# Patient Record
Sex: FEMALE | Race: WHITE | NOT HISPANIC OR LATINO
[De-identification: names, ages, dates, MRNs, and addresses within clinical notes are randomized per-mention and may not be internally consistent; named-entity substitution may affect disease eponyms.]

---

## 2021-12-10 PROBLEM — Z00.00 ENCOUNTER FOR PREVENTIVE HEALTH EXAMINATION: Status: ACTIVE | Noted: 2021-12-10

## 2021-12-14 ENCOUNTER — NON-APPOINTMENT (OUTPATIENT)
Age: 61
End: 2021-12-14

## 2021-12-15 ENCOUNTER — APPOINTMENT (OUTPATIENT)
Dept: BARIATRICS | Facility: CLINIC | Age: 61
End: 2021-12-15
Payer: COMMERCIAL

## 2021-12-15 ENCOUNTER — TRANSCRIPTION ENCOUNTER (OUTPATIENT)
Age: 61
End: 2021-12-15

## 2021-12-15 VITALS
HEIGHT: 61 IN | WEIGHT: 181.13 LBS | SYSTOLIC BLOOD PRESSURE: 132 MMHG | TEMPERATURE: 96 F | OXYGEN SATURATION: 97 % | DIASTOLIC BLOOD PRESSURE: 78 MMHG | HEART RATE: 73 BPM | BODY MASS INDEX: 34.2 KG/M2

## 2021-12-15 DIAGNOSIS — R32 UNSPECIFIED URINARY INCONTINENCE: ICD-10-CM

## 2021-12-15 PROCEDURE — 99204 OFFICE O/P NEW MOD 45 MIN: CPT

## 2021-12-15 NOTE — ASSESSMENT
[FreeTextEntry1] : not candidate for andrzej as no stomach\par feel best to convert to rygb\par this has to be done with grade b esophagitis despite meds and demeester score in 80's \par if denied for expedited nys appeal

## 2021-12-15 NOTE — HISTORY OF PRESENT ILLNESS
[de-identified] : 62 yo female who had band to sleeve and now with intractable gerd\par bmi 35 \par active was running 5 k before covid\par demeester score is 85 \par grade b esophagitis \par discussed that with weight regain to ender the gerd that things like magnetic sphincter or repair of hernia are fraught with failure\par need to repair hiatus \par and convert to rygb with extended bp limb\par as is short will measure all bowel\par

## 2021-12-27 ENCOUNTER — FORM ENCOUNTER (OUTPATIENT)
Age: 61
End: 2021-12-27

## 2022-01-05 ENCOUNTER — APPOINTMENT (OUTPATIENT)
Dept: BARIATRICS | Facility: CLINIC | Age: 62
End: 2022-01-05
Payer: COMMERCIAL

## 2022-01-05 VITALS — BODY MASS INDEX: 34.2 KG/M2 | WEIGHT: 181 LBS

## 2022-01-05 DIAGNOSIS — E78.00 PURE HYPERCHOLESTEROLEMIA, UNSPECIFIED: ICD-10-CM

## 2022-01-05 DIAGNOSIS — I10 ESSENTIAL (PRIMARY) HYPERTENSION: ICD-10-CM

## 2022-01-05 DIAGNOSIS — E78.1 PURE HYPERGLYCERIDEMIA: ICD-10-CM

## 2022-01-05 PROCEDURE — 97802 MEDICAL NUTRITION INDIV IN: CPT | Mod: 95

## 2022-01-13 ENCOUNTER — NON-APPOINTMENT (OUTPATIENT)
Age: 62
End: 2022-01-13

## 2022-01-13 DIAGNOSIS — E55.9 VITAMIN D DEFICIENCY, UNSPECIFIED: ICD-10-CM

## 2022-01-13 RX ORDER — ERGOCALCIFEROL 1.25 MG/1
1.25 MG CAPSULE, LIQUID FILLED ORAL
Qty: 12 | Refills: 0 | Status: ACTIVE | COMMUNITY
Start: 2022-01-13 | End: 1900-01-01

## 2022-01-26 ENCOUNTER — APPOINTMENT (OUTPATIENT)
Dept: BARIATRICS | Facility: CLINIC | Age: 62
End: 2022-01-26
Payer: COMMERCIAL

## 2022-01-26 VITALS — HEIGHT: 61 IN | WEIGHT: 179 LBS | BODY MASS INDEX: 33.79 KG/M2

## 2022-01-26 PROCEDURE — 99213 OFFICE O/P EST LOW 20 MIN: CPT | Mod: 95

## 2022-01-26 NOTE — ADDENDUM
[FreeTextEntry1] : This note was written by Alma Alatorre on 01/26/2022 acting as scribe for Dr. Page.

## 2022-01-26 NOTE — HISTORY OF PRESENT ILLNESS
[Home] : at home, [unfilled] , at the time of the visit. [Medical Office: ([facility])___] : at the medical office located in  [Verbal consent obtained from patient] : the patient, [unfilled] [de-identified] : Judi Becker is a 62 y/o F s/p band conversion to sleeve gastrectomy with intractable GERD, BMI 33 who presents for final visit prior to sleeve conversion to bypass for GERD. Demeester score 85. EGD demonstrated Grade B esophagitis. Risks, benefits, alternatives were explained. Pre and post operative instructions were given. All questions answered. Pt will be admitted to the hospital 2/1/22 for sleeve gastrectomy conversion to gastric bypass for GERD. She endorses allergy to morphine stating she gets angioedema.

## 2022-01-26 NOTE — ASSESSMENT
[FreeTextEntry1] : Pt will be admitted to the hospital 2/1/22 for sleeve gastrectomy conversion to gastric bypass for GERD. Pt endorses allergy to morphine.

## 2022-01-26 NOTE — END OF VISIT
[FreeTextEntry3] : All medical record entries made by the Scribe were at my, Dr. Page's, discretion and personally dictated by me on 01/26/2022. I have reviewed the chart and agree that the record accurately reflects my personal performance of the history, physical exam, assessment and plan. I have also personally directed, reviewed and agreed to the chart.

## 2022-01-31 ENCOUNTER — TRANSCRIPTION ENCOUNTER (OUTPATIENT)
Age: 62
End: 2022-01-31

## 2022-01-31 ENCOUNTER — APPOINTMENT (OUTPATIENT)
Dept: BARIATRICS | Facility: CLINIC | Age: 62
End: 2022-01-31

## 2022-01-31 VITALS
DIASTOLIC BLOOD PRESSURE: 76 MMHG | TEMPERATURE: 98 F | OXYGEN SATURATION: 99 % | HEART RATE: 61 BPM | SYSTOLIC BLOOD PRESSURE: 154 MMHG | WEIGHT: 181.44 LBS | RESPIRATION RATE: 18 BRPM | HEIGHT: 62 IN

## 2022-01-31 NOTE — PATIENT PROFILE ADULT - FALL HARM RISK - UNIVERSAL INTERVENTIONS
Bed in lowest position, wheels locked, appropriate side rails in place/Call bell, personal items and telephone in reach/Instruct patient to call for assistance before getting out of bed or chair/Non-slip footwear when patient is out of bed/[place] to call system/Physically safe environment - no spills, clutter or unnecessary equipment/Purposeful Proactive Rounding/Room/bathroom lighting operational, light cord in reach

## 2022-02-01 ENCOUNTER — APPOINTMENT (OUTPATIENT)
Dept: BARIATRICS | Facility: HOSPITAL | Age: 62
End: 2022-02-01

## 2022-02-01 ENCOUNTER — RESULT REVIEW (OUTPATIENT)
Age: 62
End: 2022-02-01

## 2022-02-01 ENCOUNTER — INPATIENT (INPATIENT)
Facility: HOSPITAL | Age: 62
LOS: 0 days | Discharge: HOME | DRG: 328 | End: 2022-02-02
Attending: SURGERY | Admitting: SURGERY
Payer: COMMERCIAL

## 2022-02-01 DIAGNOSIS — Z98.890 OTHER SPECIFIED POSTPROCEDURAL STATES: Chronic | ICD-10-CM

## 2022-02-01 DIAGNOSIS — Z90.89 ACQUIRED ABSENCE OF OTHER ORGANS: Chronic | ICD-10-CM

## 2022-02-01 DIAGNOSIS — K21.9 GASTRO-ESOPHAGEAL REFLUX DISEASE WITHOUT ESOPHAGITIS: ICD-10-CM

## 2022-02-01 DIAGNOSIS — Z98.84 BARIATRIC SURGERY STATUS: Chronic | ICD-10-CM

## 2022-02-01 DIAGNOSIS — Z90.3 ACQUIRED ABSENCE OF STOMACH [PART OF]: Chronic | ICD-10-CM

## 2022-02-01 DIAGNOSIS — Z98.891 HISTORY OF UTERINE SCAR FROM PREVIOUS SURGERY: Chronic | ICD-10-CM

## 2022-02-01 LAB
BLD GP AB SCN SERPL QL: NEGATIVE — SIGNIFICANT CHANGE UP
HCT VFR BLD CALC: 38 % — SIGNIFICANT CHANGE UP (ref 34.5–45)
HGB BLD-MCNC: 12.6 G/DL — SIGNIFICANT CHANGE UP (ref 11.5–15.5)
MCHC RBC-ENTMCNC: 27.9 PG — SIGNIFICANT CHANGE UP (ref 27–34)
MCHC RBC-ENTMCNC: 33.2 GM/DL — SIGNIFICANT CHANGE UP (ref 32–36)
MCV RBC AUTO: 84.3 FL — SIGNIFICANT CHANGE UP (ref 80–100)
NRBC # BLD: 0 /100 WBCS — SIGNIFICANT CHANGE UP (ref 0–0)
PLATELET # BLD AUTO: 230 K/UL — SIGNIFICANT CHANGE UP (ref 150–400)
RBC # BLD: 4.51 M/UL — SIGNIFICANT CHANGE UP (ref 3.8–5.2)
RBC # FLD: 12.7 % — SIGNIFICANT CHANGE UP (ref 10.3–14.5)
RH IG SCN BLD-IMP: POSITIVE — SIGNIFICANT CHANGE UP
WBC # BLD: 9.38 K/UL — SIGNIFICANT CHANGE UP (ref 3.8–10.5)
WBC # FLD AUTO: 9.38 K/UL — SIGNIFICANT CHANGE UP (ref 3.8–10.5)

## 2022-02-01 PROCEDURE — 43659 UNLISTED LAPS PX STOMACH: CPT

## 2022-02-01 PROCEDURE — 43645 LAP GASTR BYPASS INCL SMLL I: CPT | Mod: 22

## 2022-02-01 PROCEDURE — 88307 TISSUE EXAM BY PATHOLOGIST: CPT | Mod: 26

## 2022-02-01 DEVICE — STAPLER ETHICON ECHELON ENDOPATH 60MM: Type: IMPLANTABLE DEVICE | Status: FUNCTIONAL

## 2022-02-01 DEVICE — STAPLER ETHICON GST ECHELON 60MM BLUE RELOAD: Type: IMPLANTABLE DEVICE | Status: FUNCTIONAL

## 2022-02-01 RX ORDER — SCOPALAMINE 1 MG/3D
1 PATCH, EXTENDED RELEASE TRANSDERMAL ONCE
Refills: 0 | Status: COMPLETED | OUTPATIENT
Start: 2022-02-01 | End: 2022-02-01

## 2022-02-01 RX ORDER — ACETAMINOPHEN 500 MG
650 TABLET ORAL EVERY 6 HOURS
Refills: 0 | Status: DISCONTINUED | OUTPATIENT
Start: 2022-02-01 | End: 2022-02-02

## 2022-02-01 RX ORDER — METOCLOPRAMIDE HCL 10 MG
10 TABLET ORAL ONCE
Refills: 0 | Status: COMPLETED | OUTPATIENT
Start: 2022-02-01 | End: 2022-02-01

## 2022-02-01 RX ORDER — GABAPENTIN 400 MG/1
300 CAPSULE ORAL ONCE
Refills: 0 | Status: COMPLETED | OUTPATIENT
Start: 2022-02-01 | End: 2022-02-01

## 2022-02-01 RX ORDER — SODIUM CHLORIDE 9 MG/ML
1000 INJECTION, SOLUTION INTRAVENOUS
Refills: 0 | Status: DISCONTINUED | OUTPATIENT
Start: 2022-02-01 | End: 2022-02-02

## 2022-02-01 RX ORDER — ENOXAPARIN SODIUM 100 MG/ML
30 INJECTION SUBCUTANEOUS ONCE
Refills: 0 | Status: COMPLETED | OUTPATIENT
Start: 2022-02-01 | End: 2022-02-01

## 2022-02-01 RX ORDER — ONDANSETRON 8 MG/1
4 TABLET, FILM COATED ORAL EVERY 6 HOURS
Refills: 0 | Status: DISCONTINUED | OUTPATIENT
Start: 2022-02-01 | End: 2022-02-02

## 2022-02-01 RX ORDER — ACETAMINOPHEN 500 MG
1000 TABLET ORAL ONCE
Refills: 0 | Status: COMPLETED | OUTPATIENT
Start: 2022-02-01 | End: 2022-02-01

## 2022-02-01 RX ORDER — HYDROMORPHONE HYDROCHLORIDE 2 MG/ML
0.25 INJECTION INTRAMUSCULAR; INTRAVENOUS; SUBCUTANEOUS ONCE
Refills: 0 | Status: DISCONTINUED | OUTPATIENT
Start: 2022-02-01 | End: 2022-02-01

## 2022-02-01 RX ORDER — PANTOPRAZOLE SODIUM 20 MG/1
40 TABLET, DELAYED RELEASE ORAL DAILY
Refills: 0 | Status: DISCONTINUED | OUTPATIENT
Start: 2022-02-01 | End: 2022-02-02

## 2022-02-01 RX ORDER — KETOROLAC TROMETHAMINE 30 MG/ML
15 SYRINGE (ML) INJECTION EVERY 6 HOURS
Refills: 0 | Status: DISCONTINUED | OUTPATIENT
Start: 2022-02-02 | End: 2022-02-02

## 2022-02-01 RX ORDER — ACETAMINOPHEN 500 MG
1000 TABLET ORAL ONCE
Refills: 0 | Status: COMPLETED | OUTPATIENT
Start: 2022-02-01 | End: 2022-02-02

## 2022-02-01 RX ADMIN — SCOPALAMINE 1 PATCH: 1 PATCH, EXTENDED RELEASE TRANSDERMAL at 19:20

## 2022-02-01 RX ADMIN — HYDROMORPHONE HYDROCHLORIDE 0.25 MILLIGRAM(S): 2 INJECTION INTRAMUSCULAR; INTRAVENOUS; SUBCUTANEOUS at 17:43

## 2022-02-01 RX ADMIN — Medication 1000 MILLIGRAM(S): at 09:55

## 2022-02-01 RX ADMIN — ENOXAPARIN SODIUM 30 MILLIGRAM(S): 100 INJECTION SUBCUTANEOUS at 09:56

## 2022-02-01 RX ADMIN — Medication 1000 MILLIGRAM(S): at 10:00

## 2022-02-01 RX ADMIN — SCOPALAMINE 1 PATCH: 1 PATCH, EXTENDED RELEASE TRANSDERMAL at 09:55

## 2022-02-01 RX ADMIN — ONDANSETRON 4 MILLIGRAM(S): 8 TABLET, FILM COATED ORAL at 19:01

## 2022-02-01 RX ADMIN — Medication 10 MILLIGRAM(S): at 21:34

## 2022-02-01 RX ADMIN — GABAPENTIN 300 MILLIGRAM(S): 400 CAPSULE ORAL at 09:55

## 2022-02-01 RX ADMIN — SODIUM CHLORIDE 100 MILLILITER(S): 9 INJECTION, SOLUTION INTRAVENOUS at 23:31

## 2022-02-01 RX ADMIN — PANTOPRAZOLE SODIUM 40 MILLIGRAM(S): 20 TABLET, DELAYED RELEASE ORAL at 17:43

## 2022-02-01 RX ADMIN — HYDROMORPHONE HYDROCHLORIDE 0.25 MILLIGRAM(S): 2 INJECTION INTRAMUSCULAR; INTRAVENOUS; SUBCUTANEOUS at 18:15

## 2022-02-01 NOTE — PROGRESS NOTE ADULT - ASSESSMENT
62 y/o female with pmhx of GERD s/p LSG conversion to RYGB  -Pain/nausea control   -BCLD, IVF   -Protonix   -Eliquis POD3 x30d   -SCDs, OOB/A, IS   -AM labs   -Nutritional consult in AM

## 2022-02-01 NOTE — H&P ADULT - NSHPPHYSICALEXAM_GEN_ALL_CORE
Gen : AAO, NAD, sitting comfortably in chair  CV : RRR by radial pulse  Pulm : nonlabored breathing RA  Abd : obese, soft, NT, ND  Ext : WWP  skin : warm, dry, intact

## 2022-02-01 NOTE — H&P ADULT - NSICDXPASTSURGICALHX_GEN_ALL_CORE_FT
PAST SURGICAL HISTORY:  Gastric banding status 2008    H/O  section     H/O gastric sleeve 2016    H/O knee surgery right    History of tonsillectomy

## 2022-02-01 NOTE — PACU DISCHARGE NOTE - COMMENTS
pt aao x4.  VSS.  lap sites to abd x5 intact with dermabond.  denies need for pain meds at present.  report given to RIGOBERTO Carvalho on 9 uris.  denies c/o nausea at present.  pt to go to [facility] via bed with [facility] transport

## 2022-02-01 NOTE — H&P ADULT - HISTORY OF PRESENT ILLNESS
Ms. Becker is a 61 year old woman with a history of morbid obesity who underwent a conversion from a gastric band to a sleeve gastrectomy in 2006. She has since developed intractable GERD with a DeMeester score >80. She also has grade B esophagitis on EGD. She presents today for conversion to Shavon-en-Y gastric bypass.

## 2022-02-01 NOTE — H&P ADULT - ASSESSMENT
61F with history of conversion from gastric band to sleeve gastrectomy, now with intractable GERD and DeMeester score >80. Here for conversion to sarahi-en-Y gastric bypass

## 2022-02-01 NOTE — BRIEF OPERATIVE NOTE - NSICDXBRIEFPROCEDURE_GEN_ALL_CORE_FT
PROCEDURES:  Conversion, gastrectomy, sleeve, to gastric bypass, laparoscopic 01-Feb-2022 15:47:53  Michelle Guidry

## 2022-02-01 NOTE — H&P ADULT - NSICDXPASTMEDICALHX_GEN_ALL_CORE_FT
PAST MEDICAL HISTORY:  Dyslipidemia     GERD (gastroesophageal reflux disease) with grade B esophagitis    HTN (hypertension)     Obesity     Vitamin D deficiency

## 2022-02-01 NOTE — PROGRESS NOTE ADULT - SUBJECTIVE AND OBJECTIVE BOX
POST-OPERATIVE NOTE    Procedure:  LSG conversion to RYGB,     Diagnosis/Indication: Morbid obesity     Surgeon: Dr. Page    S: Pt has no complaints. Denies CP, SOB, GAUTHIER, calf tenderness. Pain controlled with medication.    O:  T(C): 36.4 (02-01-22 @ 15:35), Max: 36.4 (02-01-22 @ 15:35)  T(F): 97.6 (02-01-22 @ 15:35), Max: 97.6 (02-01-22 @ 15:35)  HR: 78 (02-01-22 @ 16:30) (75 - 78)  BP: 142/72 (02-01-22 @ 16:30) (140/61 - 159/74)  RR: 21 (02-01-22 @ 16:30) (20 - 25)  SpO2: 96% (02-01-22 @ 16:30) (94% - 98%)  Wt(kg): --            Gen: NAD, resting comfortably in bed  C/V: NSR  Pulm: Nonlabored breathing, no respiratory distress  Abd: soft , non distended ,TTP around incision site, incision clean dry and intact   Extrem: WWP, no calf edema, SCDs in place

## 2022-02-01 NOTE — BRIEF OPERATIVE NOTE - OPERATION/FINDINGS
previous sleeve gastretomy with adhesions between sleeve and liver/anterior abdominal wall resulting in twisting of sleeve    Visiport access  Adhesions taken down between omentum and anterior abdominal wall; sleeve gastretomy and anterior abdominal wall and liver  150cm BP limb counted, tacked to gastrocolic ligament  Bilateral crura dissected. Extensive scarring in this area secondary to previous sleeve and band. There was a small hiatal hernia but the crura were not reapproximated as the esophagus was dilated and filled the hiatus.   6cm pouch created over 42 Fr bougie. Small tongue of excess pouch excised.  Handsewn gastrojejunostomy with absorbable quill suture  75cm Shavon limb measured  stapled jejunojejunostomy  Latham's and mesenteric defects closed with nonabsorbable Quill suture  Hemostasis assured

## 2022-02-02 ENCOUNTER — TRANSCRIPTION ENCOUNTER (OUTPATIENT)
Age: 62
End: 2022-02-02

## 2022-02-02 VITALS
OXYGEN SATURATION: 97 % | SYSTOLIC BLOOD PRESSURE: 143 MMHG | RESPIRATION RATE: 17 BRPM | HEART RATE: 74 BPM | TEMPERATURE: 100 F | DIASTOLIC BLOOD PRESSURE: 79 MMHG

## 2022-02-02 LAB
ANION GAP SERPL CALC-SCNC: 14 MMOL/L — SIGNIFICANT CHANGE UP (ref 5–17)
BUN SERPL-MCNC: 16 MG/DL — SIGNIFICANT CHANGE UP (ref 7–23)
CALCIUM SERPL-MCNC: 8.5 MG/DL — SIGNIFICANT CHANGE UP (ref 8.4–10.5)
CHLORIDE SERPL-SCNC: 107 MMOL/L — SIGNIFICANT CHANGE UP (ref 96–108)
CO2 SERPL-SCNC: 21 MMOL/L — LOW (ref 22–31)
CREAT SERPL-MCNC: 0.98 MG/DL — SIGNIFICANT CHANGE UP (ref 0.5–1.3)
GLUCOSE SERPL-MCNC: 112 MG/DL — HIGH (ref 70–99)
HCT VFR BLD CALC: 36 % — SIGNIFICANT CHANGE UP (ref 34.5–45)
HCV AB S/CO SERPL IA: 0.04 S/CO — SIGNIFICANT CHANGE UP
HCV AB SERPL-IMP: SIGNIFICANT CHANGE UP
HGB BLD-MCNC: 11.5 G/DL — SIGNIFICANT CHANGE UP (ref 11.5–15.5)
MAGNESIUM SERPL-MCNC: 1.6 MG/DL — SIGNIFICANT CHANGE UP (ref 1.6–2.6)
MCHC RBC-ENTMCNC: 26.6 PG — LOW (ref 27–34)
MCHC RBC-ENTMCNC: 31.9 GM/DL — LOW (ref 32–36)
MCV RBC AUTO: 83.3 FL — SIGNIFICANT CHANGE UP (ref 80–100)
NRBC # BLD: 0 /100 WBCS — SIGNIFICANT CHANGE UP (ref 0–0)
PHOSPHATE SERPL-MCNC: 3 MG/DL — SIGNIFICANT CHANGE UP (ref 2.5–4.5)
PLATELET # BLD AUTO: 233 K/UL — SIGNIFICANT CHANGE UP (ref 150–400)
POTASSIUM SERPL-MCNC: 4.1 MMOL/L — SIGNIFICANT CHANGE UP (ref 3.5–5.3)
POTASSIUM SERPL-SCNC: 4.1 MMOL/L — SIGNIFICANT CHANGE UP (ref 3.5–5.3)
RBC # BLD: 4.32 M/UL — SIGNIFICANT CHANGE UP (ref 3.8–5.2)
RBC # FLD: 12.7 % — SIGNIFICANT CHANGE UP (ref 10.3–14.5)
SODIUM SERPL-SCNC: 142 MMOL/L — SIGNIFICANT CHANGE UP (ref 135–145)
WBC # BLD: 8.68 K/UL — SIGNIFICANT CHANGE UP (ref 3.8–10.5)
WBC # FLD AUTO: 8.68 K/UL — SIGNIFICANT CHANGE UP (ref 3.8–10.5)

## 2022-02-02 PROCEDURE — 84100 ASSAY OF PHOSPHORUS: CPT

## 2022-02-02 PROCEDURE — 86803 HEPATITIS C AB TEST: CPT

## 2022-02-02 PROCEDURE — 85027 COMPLETE CBC AUTOMATED: CPT

## 2022-02-02 PROCEDURE — 80048 BASIC METABOLIC PNL TOTAL CA: CPT

## 2022-02-02 PROCEDURE — 86900 BLOOD TYPING SEROLOGIC ABO: CPT

## 2022-02-02 PROCEDURE — 83735 ASSAY OF MAGNESIUM: CPT

## 2022-02-02 PROCEDURE — 36415 COLL VENOUS BLD VENIPUNCTURE: CPT

## 2022-02-02 PROCEDURE — C1889: CPT

## 2022-02-02 PROCEDURE — 86850 RBC ANTIBODY SCREEN: CPT

## 2022-02-02 PROCEDURE — 88307 TISSUE EXAM BY PATHOLOGIST: CPT

## 2022-02-02 PROCEDURE — 86901 BLOOD TYPING SEROLOGIC RH(D): CPT

## 2022-02-02 RX ORDER — OMEPRAZOLE 10 MG/1
1 CAPSULE, DELAYED RELEASE ORAL
Qty: 30 | Refills: 0
Start: 2022-02-02 | End: 2022-03-03

## 2022-02-02 RX ORDER — CHOLECALCIFEROL (VITAMIN D3) 125 MCG
1 CAPSULE ORAL
Qty: 0 | Refills: 0 | DISCHARGE

## 2022-02-02 RX ORDER — ACETAMINOPHEN 500 MG
2 TABLET ORAL
Qty: 32 | Refills: 0
Start: 2022-02-02 | End: 2022-02-05

## 2022-02-02 RX ORDER — MAGNESIUM SULFATE 500 MG/ML
2 VIAL (ML) INJECTION
Refills: 0 | Status: COMPLETED | OUTPATIENT
Start: 2022-02-02 | End: 2022-02-02

## 2022-02-02 RX ORDER — OMEPRAZOLE 10 MG/1
1 CAPSULE, DELAYED RELEASE ORAL
Qty: 0 | Refills: 0 | DISCHARGE

## 2022-02-02 RX ADMIN — Medication 15 MILLIGRAM(S): at 14:28

## 2022-02-02 RX ADMIN — PANTOPRAZOLE SODIUM 40 MILLIGRAM(S): 20 TABLET, DELAYED RELEASE ORAL at 11:51

## 2022-02-02 RX ADMIN — Medication 1000 MILLIGRAM(S): at 01:00

## 2022-02-02 RX ADMIN — Medication 15 MILLIGRAM(S): at 03:48

## 2022-02-02 RX ADMIN — Medication 15 MILLIGRAM(S): at 14:45

## 2022-02-02 RX ADMIN — Medication 25 GRAM(S): at 11:50

## 2022-02-02 RX ADMIN — SCOPALAMINE 1 PATCH: 1 PATCH, EXTENDED RELEASE TRANSDERMAL at 06:59

## 2022-02-02 RX ADMIN — Medication 25 GRAM(S): at 14:28

## 2022-02-02 RX ADMIN — Medication 15 MILLIGRAM(S): at 09:41

## 2022-02-02 RX ADMIN — Medication 15 MILLIGRAM(S): at 04:20

## 2022-02-02 RX ADMIN — Medication 15 MILLIGRAM(S): at 10:00

## 2022-02-02 RX ADMIN — Medication 400 MILLIGRAM(S): at 00:36

## 2022-02-02 NOTE — DISCHARGE NOTE PROVIDER - NSDCCPGOAL_GEN_ALL_CORE_FT
To get better and follow your care plan as instructed. To get better and follow your care plan as instructed.  Ms. Becker is a 61 year old woman with a history of morbid obesity who underwent a conversion from a gastric band to a sleeve gastrectomy in 2006. She had since developed intractable GERD with a DeMeester score >80. She also had grade B esophagitis on EGD. She presented on day of admission for conversion to Shavon-en-Y gastric bypass. Pt tolerated the procedure well. At time of discharge, pt was tolerating a bariatric clear liquid diet, and pt's pain was controlled. Plan is to follow up with Dr. Page in the office.      1) Please take Tylenol 650 mg every 4 to 6 hours by mouth for moderate pain control. Please do not exceed over 4,000 mg of Tylenol a day.  2) Please take Omeprazole 40 mg once a day by mouth.

## 2022-02-02 NOTE — DIETITIAN INITIAL EVALUATION ADULT. - OTHER INFO
62 y/o female with PMH of HTN, MO with GERD and  esophagitis now POD1 s/p LSG conversion to RYGB. Recovering w/o any complications. Tachycardia and nausea overnight have now resolved.    On assessment, pt resting in bed. Currently on BARICLLIQ diet, awaiting meal tray arrival. Pt consumed 1/2 cup of fluids this AM. Pain and nausea well controlled at this time, no n/v. Discussed volumes of various cup sizes on tray table and encouraged aiming for 4 oz/hr as tolerated. Prepared with protein shakes and vitamins after discharge. RD provided indepth edu on diet advancement and specific nutrient needs s/p RYGB. NKFA. No dietary restrictions at home. Skin: Jacek 18, surgical incisions. GI: WDL per flowsheet. RD to follow up per protocol.

## 2022-02-02 NOTE — PROGRESS NOTE ADULT - SUBJECTIVE AND OBJECTIVE BOX
SUBJECTIVE:  Flatus: [ ] YES [ ] NO             Bowel Movement: [ ] YES [ ] NO  Pain (0-10):            Pain Control Adequate: [ ] YES [ ] NO  Nausea: [ ] YES [ ] NO            Vomiting: [ ] YES [ ] NO  Diarrhea: [ ] YES [ ] NO         Constipation: [ ] YES [ ] NO     Chest Pain: [ ] YES [ ] NO    SOB:  [ ] YES [ ] NO    MEDICATIONS  (STANDING):  ketorolac   Injectable 15 milliGRAM(s) IV Push every 6 hours  lactated ringers. 1000 milliLiter(s) (100 mL/Hr) IV Continuous <Continuous>  ondansetron Injectable 4 milliGRAM(s) IV Push every 6 hours  pantoprazole  Injectable 40 milliGRAM(s) IV Push daily    MEDICATIONS  (PRN):  acetaminophen    Suspension .. 650 milliGRAM(s) Oral every 6 hours PRN Temp greater or equal to 38.5C (101.3F), Mild Pain (1 - 3)      Vital Signs Last 24 Hrs  T(C): 37.4 (02 Feb 2022 04:42), Max: 37.4 (02 Feb 2022 04:42)  T(F): 99.3 (02 Feb 2022 04:42), Max: 99.3 (02 Feb 2022 04:42)  HR: 87 (02 Feb 2022 04:42) (75 - 103)  BP: 151/76 (02 Feb 2022 04:42) (140/61 - 163/79)  BP(mean): 104 (01 Feb 2022 20:00) (88 - 112)  RR: 18 (02 Feb 2022 04:42) (12 - 25)  SpO2: 93% (02 Feb 2022 04:42) (93% - 98%)    Physical Exam:  General: NAD, resting comfortably in bed  Pulmonary: Nonlabored breathing, no respiratory distress  Cardiovascular: NSR  Abdominal: soft, NT/ND  Extremities: WWP, normal strength  Neuro: A/O x 3, CNs II-XII grossly intact, no focal deficits, normal motor/sensation  Pulses: palpable distal pulses    I&O's Summary    01 Feb 2022 07:01  -  02 Feb 2022 06:47  --------------------------------------------------------  IN: 1225 mL / OUT: 1400 mL / NET: -175 mL        LABS:                        12.6   9.38  )-----------( 230      ( 01 Feb 2022 23:01 )             38.0               CAPILLARY BLOOD GLUCOSE            RADIOLOGY & ADDITIONAL STUDIES:   SUBJECTIVE: Pt visited bedside this morning with the surgery team. Was nauseous overnight which was improved after medication. Pain is mainly controlled and started taking CLD. Denies fever, chills, dizziness, light-headedness, palpitation, nausea, vomiting, SOB, CP or pain in extremities.    MEDICATIONS  (STANDING):  ketorolac   Injectable 15 milliGRAM(s) IV Push every 6 hours  lactated ringers. 1000 milliLiter(s) (100 mL/Hr) IV Continuous <Continuous>  ondansetron Injectable 4 milliGRAM(s) IV Push every 6 hours  pantoprazole  Injectable 40 milliGRAM(s) IV Push daily    MEDICATIONS  (PRN):  acetaminophen    Suspension .. 650 milliGRAM(s) Oral every 6 hours PRN Temp greater or equal to 38.5C (101.3F), Mild Pain (1 - 3)      Vital Signs Last 24 Hrs  T(C): 37.4 (02 Feb 2022 04:42), Max: 37.4 (02 Feb 2022 04:42)  T(F): 99.3 (02 Feb 2022 04:42), Max: 99.3 (02 Feb 2022 04:42)  HR: 87 (02 Feb 2022 04:42) (75 - 103)  BP: 151/76 (02 Feb 2022 04:42) (140/61 - 163/79)  BP(mean): 104 (01 Feb 2022 20:00) (88 - 112)  RR: 18 (02 Feb 2022 04:42) (12 - 25)  SpO2: 93% (02 Feb 2022 04:42) (93% - 98%)    Physical Exam:  General: NAD, resting comfortably in bed  Pulmonary: Nonlabored breathing, no respiratory distress  Cardiovascular: NSR  Abdominal: Soft, obese, mildly distended, incision-appropriate tenderness w/o rebound opr guarding, incisions C/D/I  Extremities: WWP, normal strength  Neuro: A/O x 3, CNs II-XII grossly intact, no focal deficits, normal motor/sensation  Pulses: palpable distal pulses    I&O's Summary    01 Feb 2022 07:01  -  02 Feb 2022 06:47  --------------------------------------------------------  IN: 1225 mL / OUT: 1400 mL / NET: -175 mL        LABS:                        12.6   9.38  )-----------( 230      ( 01 Feb 2022 23:01 )             38.0               CAPILLARY BLOOD GLUCOSE            RADIOLOGY & ADDITIONAL STUDIES:   POD1 S/P LSG conversion to RYGB    SUBJECTIVE: Pt visited bedside this morning with the surgery team. Was nauseous overnight which was improved after medication. Pain is mainly controlled and started taking CLD. Denies fever, chills, dizziness, light-headedness, palpitation, nausea, vomiting, SOB, CP or pain in extremities.    MEDICATIONS  (STANDING):  ketorolac   Injectable 15 milliGRAM(s) IV Push every 6 hours  lactated ringers. 1000 milliLiter(s) (100 mL/Hr) IV Continuous <Continuous>  ondansetron Injectable 4 milliGRAM(s) IV Push every 6 hours  pantoprazole  Injectable 40 milliGRAM(s) IV Push daily    MEDICATIONS  (PRN):  acetaminophen    Suspension .. 650 milliGRAM(s) Oral every 6 hours PRN Temp greater or equal to 38.5C (101.3F), Mild Pain (1 - 3)      Vital Signs Last 24 Hrs  T(C): 37.4 (02 Feb 2022 04:42), Max: 37.4 (02 Feb 2022 04:42)  T(F): 99.3 (02 Feb 2022 04:42), Max: 99.3 (02 Feb 2022 04:42)  HR: 87 (02 Feb 2022 04:42) (75 - 103)  BP: 151/76 (02 Feb 2022 04:42) (140/61 - 163/79)  BP(mean): 104 (01 Feb 2022 20:00) (88 - 112)  RR: 18 (02 Feb 2022 04:42) (12 - 25)  SpO2: 93% (02 Feb 2022 04:42) (93% - 98%)    Physical Exam:  General: NAD, resting comfortably in bed  Pulmonary: Nonlabored breathing, no respiratory distress  Cardiovascular: NSR  Abdominal: Soft, obese, mildly distended, incision-appropriate tenderness w/o rebound opr guarding, incisions C/D/I  Extremities: WWP, normal strength, SCDs in place  Neuro: A/O x 3, CNs II-XII grossly intact, no focal deficits, normal motor/sensation  Pulses: palpable distal pulses    I&O's Summary    01 Feb 2022 07:01  -  02 Feb 2022 06:47  --------------------------------------------------------  IN: 1225 mL / OUT: 1400 mL / NET: -175 mL        LABS:                        12.6   9.38  )-----------( 230      ( 01 Feb 2022 23:01 )             38.0               CAPILLARY BLOOD GLUCOSE            RADIOLOGY & ADDITIONAL STUDIES:

## 2022-02-02 NOTE — DISCHARGE NOTE NURSING/CASE MANAGEMENT/SOCIAL WORK - NSDCPEFALRISK_GEN_ALL_CORE
For information on Fall & Injury Prevention, visit: [email or web address] OR  [email or web address] OR  https://www.cdc.gov/steadi/patient.html

## 2022-02-02 NOTE — DISCHARGE NOTE PROVIDER - NSDCFUADDINST_GEN_ALL_CORE_FT
Follow up with Dr. Page in 1 week. Call the office at  to schedule your appointment. You may shower; soap and water over incision sites. Do not scrub. Pat dry when done. No tub bathing or swimming until cleared. Keep incision sites out of the sun as scars will darken. No heavy lifting (>10lbs) or strenuous exercise. Diet: Bariatric Full Fluids. 60 grams protein daily.  64 fluid ounces water daily. Drink small sips throughout the day. Continue diet as outlined by paperwork received as a pre-operative patient. You should be urinating at least 3-4x per day. Call the office if you experience increasing abdominal pain, nausea, vomiting, or temperature >100.4F.  NO ASPIRIN OR NSAIDs until approved by Dr. Page. Avoid alcoholic beverages until cleared by Dr. Page.  1) Please take Tylenol 650 mg every 4 to 6 hours by mouth for moderate pain control. Please do not exceed over 4,000 mg of Tylenol a day.  2) Please take Omeprazole 40 mg once a day by mouth.

## 2022-02-02 NOTE — DISCHARGE NOTE NURSING/CASE MANAGEMENT/SOCIAL WORK - PATIENT PORTAL LINK FT
You can access the FollowMyHealth Patient Portal offered by [facility] by registering at the following website: [email or web address]. By joining [facility]’s FollowMyHealth portal, you will also be able to view your health information using other applications (apps) compatible with our system.

## 2022-02-02 NOTE — DIETITIAN INITIAL EVALUATION ADULT. - OTHER CALCULATIONS
IBW used to calculate energy needs due to pt's current body weight exceeding 120% of IBW (164%). Above energy needs calculated for wt maintenance, adj for wt and ht.   Weeks 1-2 estimated needs: kcal/day (10-12kcal/kg), g pro/day (1.5-2.1g/kg), >/=64oz clear fluids.

## 2022-02-02 NOTE — PROGRESS NOTE ADULT - ASSESSMENT
60 y/o female with PMH of HTN, MO with GERD and  esophagitis now POD1 s/p LSG conversion to RYGB. Recovering w/o any complications. Tachycardia and nausea overnight have now resolved. Pain controlled. Tolerating BCLD. Ambulating. Afebrile, AVSS.    -BCLD, IVF   -Pain/nausea control   -Protonix   -Eliquis POD3 x30d   -SCDs, OOB/A, IS   -AM labs   -F/U nutritional consult   62 y/o female with PMH of HTN, MO with GERD and  esophagitis now POD1 s/p LSG conversion to RYGB. Recovering w/o any complications. Tachycardia and nausea overnight have now resolved. Pain controlled. Tolerating BCLD. Ambulating. Afebrile, AVSS.    -BCLD, IVF   -Pain/nausea control   -Protonix   -SCDs, OOB/A, IS   -AM labs   -F/U nutritional consult

## 2022-02-02 NOTE — DISCHARGE NOTE PROVIDER - NSDCCPCAREPLAN_GEN_ALL_CORE_FT
PRINCIPAL DISCHARGE DIAGNOSIS  Diagnosis: GERD (gastroesophageal reflux disease)  Assessment and Plan of Treatment: s/p conversion of sleeve to rnygb      SECONDARY DISCHARGE DIAGNOSES  Diagnosis: Morbid obesity  Assessment and Plan of Treatment:      PRINCIPAL DISCHARGE DIAGNOSIS  Diagnosis: GERD (gastroesophageal reflux disease)  Assessment and Plan of Treatment: Ms. Becker is a 61 year old woman with a history of morbid obesity who underwent a conversion from a gastric band to a sleeve gastrectomy in 2006. She had since developed intractable GERD with a DeMeester score >80. She also had grade B esophagitis on EGD. She presented on day of admission for conversion to Shavon-en-Y gastric bypass. Pt tolerated the procedure well. At time of discharge, pt was tolerating a bariatric clear liquid diet, and pt's pain was controlled. Plan is to follow up with Dr. Page in the office.  1) Please take Tylenol 650 mg every 4 to 6 hours by mouth for moderate pain control. Please do not exceed over 4,000 mg of Tylenol a day.  2) Please take Omeprazole 40 mg once a day by mouth.        SECONDARY DISCHARGE DIAGNOSES  Diagnosis: Morbid obesity  Assessment and Plan of Treatment:

## 2022-02-02 NOTE — DISCHARGE NOTE PROVIDER - CARE PROVIDER_API CALL
Dwaine aPge (MD)  Surgery  [address] [place] [address], [place] [address]  Phone: [phone]  Fax: [phone]  Follow Up Time: 1 week

## 2022-02-02 NOTE — DISCHARGE NOTE PROVIDER - NSDCMRMEDTOKEN_GEN_ALL_CORE_FT
Dexilant 60 mg oral delayed release capsule: orally prn, As Needed  lisinopril 20 mg oral tablet: 1 tab(s) orally once a day  omeprazole 40 mg oral delayed release capsule: 1 cap(s) orally once a day  simvastatin 40 mg oral tablet: 1 tab(s) orally once a day (at bedtime)  Vitamin D3 1250 mcg (50,000 intl units) oral capsule: 1 cap(s) orally once a week   Dexilant 60 mg oral delayed release capsule: orally prn, As Needed  lisinopril 20 mg oral tablet: 1 tab(s) orally once a day  omeprazole 40 mg oral delayed release capsule: 1 cap(s) orally once a day MDD:1 capsule  simvastatin 40 mg oral tablet: 1 tab(s) orally once a day (at bedtime)  Tylenol Regular Strength 325 mg oral tablet: 2 tab(s) orally every 6 hours, As Needed -for moderate pain - for severe pain MDD:8 tablets

## 2022-02-02 NOTE — DISCHARGE NOTE PROVIDER - NSDCCPTREATMENT_GEN_ALL_CORE_FT
PRINCIPAL PROCEDURE  Procedure: Conversion, gastrectomy, sleeve, to gastric bypass, laparoscopic  Findings and Treatment:

## 2022-02-02 NOTE — DISCHARGE NOTE PROVIDER - HOSPITAL COURSE
Ms. Becker is a 61 year old woman with a history of morbid obesity who underwent a conversion from a gastric band to a sleeve gastrectomy in 2006. She had since developed intractable GERD with a DeMeester score >80. She also had grade B esophagitis on EGD. She presented on day of admission for conversion to Shavon-en-Y gastric bypass. Her postoperative course was unremarkable with advancement of diet, passing trial of void, and pain control. On day of discharge patient was stable to be d/c'd home.       Ms. Becker is a 61 year old woman with a history of morbid obesity who underwent a conversion from a gastric band to a sleeve gastrectomy in 2006. She had since developed intractable GERD with a DeMeester score >80. She also had grade B esophagitis on EGD. She presented on day of admission for conversion to Shavon-en-Y gastric bypass. Pt tolerated the procedure well. At time of discharge, pt was tolerating a bariatric clear liquid diet, and pt's pain was controlled. Plan is to follow up with Dr. Page in the office.

## 2022-02-04 ENCOUNTER — NON-APPOINTMENT (OUTPATIENT)
Age: 62
End: 2022-02-04

## 2022-02-04 DIAGNOSIS — L90.5 SCAR CONDITIONS AND FIBROSIS OF SKIN: ICD-10-CM

## 2022-02-04 DIAGNOSIS — Z88.6 ALLERGY STATUS TO ANALGESIC AGENT: ICD-10-CM

## 2022-02-04 DIAGNOSIS — E78.5 HYPERLIPIDEMIA, UNSPECIFIED: ICD-10-CM

## 2022-02-04 DIAGNOSIS — R10.9 UNSPECIFIED ABDOMINAL PAIN: ICD-10-CM

## 2022-02-04 DIAGNOSIS — Z98.84 BARIATRIC SURGERY STATUS: ICD-10-CM

## 2022-02-04 DIAGNOSIS — R11.0 NAUSEA: ICD-10-CM

## 2022-02-04 DIAGNOSIS — R00.0 TACHYCARDIA, UNSPECIFIED: ICD-10-CM

## 2022-02-04 DIAGNOSIS — K44.9 DIAPHRAGMATIC HERNIA WITHOUT OBSTRUCTION OR GANGRENE: ICD-10-CM

## 2022-02-04 DIAGNOSIS — I10 ESSENTIAL (PRIMARY) HYPERTENSION: ICD-10-CM

## 2022-02-04 DIAGNOSIS — E66.9 OBESITY, UNSPECIFIED: ICD-10-CM

## 2022-02-04 DIAGNOSIS — K21.00 GASTRO-ESOPHAGEAL REFLUX DISEASE WITH ESOPHAGITIS, WITHOUT BLEEDING: ICD-10-CM

## 2022-02-04 DIAGNOSIS — K66.0 PERITONEAL ADHESIONS (POSTPROCEDURAL) (POSTINFECTION): ICD-10-CM

## 2022-02-16 ENCOUNTER — APPOINTMENT (OUTPATIENT)
Dept: BARIATRICS | Facility: CLINIC | Age: 62
End: 2022-02-16
Payer: COMMERCIAL

## 2022-02-16 VITALS — HEIGHT: 61 IN | WEIGHT: 166.25 LBS | BODY MASS INDEX: 31.39 KG/M2

## 2022-02-16 DIAGNOSIS — E66.9 OBESITY, UNSPECIFIED: ICD-10-CM

## 2022-02-16 PROBLEM — E78.5 HYPERLIPIDEMIA, UNSPECIFIED: Chronic | Status: ACTIVE | Noted: 2022-01-31

## 2022-02-16 PROBLEM — K21.9 GASTRO-ESOPHAGEAL REFLUX DISEASE WITHOUT ESOPHAGITIS: Chronic | Status: ACTIVE | Noted: 2022-02-01

## 2022-02-16 PROBLEM — I10 ESSENTIAL (PRIMARY) HYPERTENSION: Chronic | Status: ACTIVE | Noted: 2022-01-31

## 2022-02-16 PROBLEM — E55.9 VITAMIN D DEFICIENCY, UNSPECIFIED: Chronic | Status: ACTIVE | Noted: 2022-01-31

## 2022-02-16 LAB — SURGICAL PATHOLOGY STUDY: SIGNIFICANT CHANGE UP

## 2022-02-16 PROCEDURE — 99024 POSTOP FOLLOW-UP VISIT: CPT

## 2022-02-16 NOTE — END OF VISIT
[FreeTextEntry3] : All medical record entries made by the Scribe were at my, Dr. Page's, discretion and personally dictated by me on 02/16/2022. I have reviewed the chart and agree that the record accurately reflects my personal performance of the history, physical exam, assessment and plan. I have also personally directed, reviewed and agreed to the chart.

## 2022-02-16 NOTE — ADDENDUM
[FreeTextEntry1] : This note was written by Alma Alatorre on 02/16/2022 acting as scribe for Dr. Page.

## 2022-02-16 NOTE — ASSESSMENT
[FreeTextEntry1] : Doing well s/p sleeve gastrectomy conversion to gastric bypass, HH repair for intractable GERD 2/1/22. Pt will see our dietician and follow up in 6 weeks. Blood work at 3 months post op.

## 2022-02-16 NOTE — HISTORY OF PRESENT ILLNESS
[Home] : at home, [unfilled] , at the time of the visit. [Medical Office: ([facility])___] : at the medical office located in  [Verbal consent obtained from patient] : the patient, [unfilled] [de-identified] : Judi Becker is a 60 y/o F who presents today for post op s/p sleeve gastrectomy conversion to gastric bypass, HH repair for intractable GERD 2/1/22. She is doing well overall. States GERD symptoms have resolved. Tolerating diet. Reminded pt to avoid foods with high glycemic load. Pt will see our dietician and follow up in 6 weeks. Will do blood work at 3 months post op.

## 2022-03-30 ENCOUNTER — APPOINTMENT (OUTPATIENT)
Dept: BARIATRICS | Facility: CLINIC | Age: 62
End: 2022-03-30
Payer: COMMERCIAL

## 2022-03-30 VITALS
TEMPERATURE: 97.5 F | HEART RATE: 67 BPM | WEIGHT: 158.44 LBS | HEIGHT: 61 IN | BODY MASS INDEX: 29.91 KG/M2 | SYSTOLIC BLOOD PRESSURE: 137 MMHG | DIASTOLIC BLOOD PRESSURE: 80 MMHG | OXYGEN SATURATION: 98 %

## 2022-03-30 DIAGNOSIS — L29.9 PRURITUS, UNSPECIFIED: ICD-10-CM

## 2022-03-30 PROCEDURE — 99024 POSTOP FOLLOW-UP VISIT: CPT

## 2022-03-31 PROBLEM — L29.9 ITCHING: Status: ACTIVE | Noted: 2022-03-31

## 2022-03-31 NOTE — HISTORY OF PRESENT ILLNESS
[de-identified] : Ms. Becker presents today for a follow up visit 8 weeks s/p conversion of sleeve gastrectomy to gastric bypass and hiatal hernia repair on 2/1/22. Denies any GERD symptoms. Reports pale stool, explained this is due to her distal bypass and malabsorption. Also continues to complain of itchiness from the neck down to her abdomen, mostly during the night time. She has stopped using fragrances and powders, not taking any new medications. Unrelieved by hydrocortisone cream or benadyl. Recent labs show normal bilirubin. Otherwise, tolerating a regular diet well with adequate protein intake. Compliant with vitamin supplementation.

## 2022-03-31 NOTE — REVIEW OF SYSTEMS
[Negative] : Allergic/Immunologic [de-identified] : itching from skin to abdomen. Occasionally notes redness/rash

## 2022-03-31 NOTE — ASSESSMENT
[FreeTextEntry1] : 61 year old female having a normal postoperative course 8 weeks s/p conversion of sleeve gastrectomy to gastric bypass. Denies GERD symptomatology. Complains of intense itching unrelieved by medication/ointments, bilirubin levels are normal. Will repeat labs in 4 weeks to check levels again, if persists, will refer to dermatology.

## 2022-05-11 ENCOUNTER — APPOINTMENT (OUTPATIENT)
Dept: BARIATRICS | Facility: CLINIC | Age: 62
End: 2022-05-11
Payer: COMMERCIAL

## 2022-05-11 VITALS — HEIGHT: 61 IN | BODY MASS INDEX: 28.42 KG/M2 | WEIGHT: 150.5 LBS

## 2022-05-11 PROCEDURE — 99442: CPT

## 2022-05-11 NOTE — ASSESSMENT
[FreeTextEntry1] : 61 year old female doing well 3 months s/p conversion to RYGB. Tolerating a regular diet well with adequate protein intake and compliant with vitamin supplementation. Blood work reviewed, all labs are unremarkable. Patient will follow up in 3 months.

## 2022-09-22 ENCOUNTER — APPOINTMENT (OUTPATIENT)
Dept: BARIATRICS | Facility: CLINIC | Age: 62
End: 2022-09-22

## 2022-09-22 VITALS — BODY MASS INDEX: 25.74 KG/M2 | WEIGHT: 136.31 LBS | HEIGHT: 61 IN

## 2022-09-22 DIAGNOSIS — K21.9 GASTRO-ESOPHAGEAL REFLUX DISEASE W/OUT ESOPHAGITIS: ICD-10-CM

## 2022-09-22 PROCEDURE — 99214 OFFICE O/P EST MOD 30 MIN: CPT | Mod: 95

## 2023-01-30 ENCOUNTER — NON-APPOINTMENT (OUTPATIENT)
Age: 63
End: 2023-01-30

## 2023-02-16 ENCOUNTER — APPOINTMENT (OUTPATIENT)
Dept: BARIATRICS | Facility: CLINIC | Age: 63
End: 2023-02-16
Payer: COMMERCIAL

## 2023-02-16 VITALS — BODY MASS INDEX: 23.41 KG/M2 | HEIGHT: 61 IN | WEIGHT: 124 LBS

## 2023-02-16 PROCEDURE — 99213 OFFICE O/P EST LOW 20 MIN: CPT | Mod: 95

## 2024-01-23 NOTE — HISTORY OF PRESENT ILLNESS
[Home] : at home, [unfilled] , at the time of the visit. [Medical Office: ([facility])___] : at the medical office located in  [Verbal consent obtained from patient] : the patient, [unfilled] [de-identified] : Ms. Becker presents today for a follow up visit 3 months s/p conversion of VSG to RYGB and hiatal hernia repair 2/1/22. Denies any GERD symptoms, pain or n/v. Itchiness in skin has improved since last visit. Total bilirubin continues being normal in recent blood work, all labs are unremarkable. Patient is compliant with vitamin supplementation. Tolerating a regular diet with adequate protein intake. Denies any difficulties with bowel movements. N/A

## 2024-01-31 ENCOUNTER — APPOINTMENT (OUTPATIENT)
Dept: SURGERY | Facility: CLINIC | Age: 64
End: 2024-01-31
Payer: COMMERCIAL

## 2024-01-31 VITALS
HEIGHT: 61 IN | OXYGEN SATURATION: 100 % | SYSTOLIC BLOOD PRESSURE: 129 MMHG | DIASTOLIC BLOOD PRESSURE: 81 MMHG | HEART RATE: 68 BPM | WEIGHT: 126.56 LBS | TEMPERATURE: 97.6 F | BODY MASS INDEX: 23.9 KG/M2

## 2024-01-31 DIAGNOSIS — K82.8 OTHER SPECIFIED DISEASES OF GALLBLADDER: ICD-10-CM

## 2024-01-31 PROCEDURE — 99204 OFFICE O/P NEW MOD 45 MIN: CPT

## 2024-01-31 PROCEDURE — 99214 OFFICE O/P EST MOD 30 MIN: CPT

## 2024-01-31 NOTE — PLAN
[FreeTextEntry1] : Prescribe 300 mg of actigall for 2-3 weeks and then get MRCP to assess possible cholecystectomy. Follow up after imaging results.

## 2024-01-31 NOTE — ASSESSMENT
[FreeTextEntry1] : Ms. Judi Becker is a 61-year-old female with PSHx of band conversion to VSG, approximately 2 years s/p VSG conversion to RYGB for GERD returns today after a recent ER visit. She's doing extremely well but reports repetitive right upper quadrant post-prandial pain. CT scan shows no bowel obstruction but sludge along with possible ductal dilation. Labs show transient rise in one LFTs, otherwise labs are normal. USD also shows sludge. We will start her on actigall 300 mg BID which should dissolve the sludge. I agree with her gastroenterologist's plan to get MRCP as ERCP is complex in post-bariatric patient. If MRCP is negative as expected, we can go ahead and take out the gallbladder whenever it is convenient for the patient but continue with actigall until then. Also make sure imaging does not show any internal hernia that can possibly explain episodic pain. Advised to take actigall for 2-3 weeks and then get an MRCP.

## 2024-01-31 NOTE — HISTORY OF PRESENT ILLNESS
[de-identified] : Ms. Judi Becker is a 61-year-old female with PSHx of band conversion to VSG, approximately 2 years s/p VSG conversion to RYGB for GERD returns today after a recent ER visit. She's doing extremely well but reports repetitive right upper quadrant post-prandial pain. CT scan shows no bowel obstruction but sludge along with possible ductal dilation. Labs show transient rise in one LFTs, otherwise labs are normal. USD also shows sludge. We will start her on actigall 300 mg BID which should dissolve the sludge. I agree with her gastroenterologist's plan to get MRCP as ERCP is complex in post-bariatric patient. If MRCP is negative as expected, we can go ahead and take out the gallbladder whenever it is convenient for the patient but continue with actigall until then. Also make sure imaging does not show any internal hernia that can possibly explain episodic pain. Advised to take actigall for 2-3 weeks and then get an MRCP.

## 2024-01-31 NOTE — END OF VISIT
[Time Spent: ___ minutes] : I have spent [unfilled] minutes of time on the encounter. [FreeTextEntry3] :  All medical record entries made by the Scribe were at my, JR Batista , direction and personally dictated by me on 01/31/2024 . I have reviewed the chart and agree that the record accurately reflects my personal performance of the history, physical exam, assessment and plan. I have also personally directed, reviewed, and agreed with the chart.

## 2024-03-19 ENCOUNTER — NON-APPOINTMENT (OUTPATIENT)
Age: 64
End: 2024-03-19

## 2024-03-20 ENCOUNTER — APPOINTMENT (OUTPATIENT)
Dept: BARIATRICS | Facility: CLINIC | Age: 64
End: 2024-03-20
Payer: COMMERCIAL

## 2024-03-20 VITALS — BODY MASS INDEX: 23.6 KG/M2 | WEIGHT: 125 LBS | HEIGHT: 61 IN

## 2024-03-20 DIAGNOSIS — Z98.84 BARIATRIC SURGERY STATUS: ICD-10-CM

## 2024-03-20 DIAGNOSIS — K83.8 OTHER SPECIFIED DISEASES OF BILIARY TRACT: ICD-10-CM

## 2024-03-20 PROCEDURE — 99214 OFFICE O/P EST MOD 30 MIN: CPT

## 2024-03-20 NOTE — ASSESSMENT
[FreeTextEntry1] : Ms. Judi Becker is a 61-year-old female with PSHx of band conversion to VSG, approximately 2 years s/p VSG conversion to RYGB for GERD returns today. During her last visit, she reported repetitive right upper quadrant post-prandial pain. We started her on actigall 300 mg BID and got an MRCP to assess the biliary system. MRCP done on 03/07/2024 shows gallstones with nothing in the common duct. Today, she complains of post-prandial abd pain that last for a couple of hours and occurs 3-4 times a week. Advised the patient to continue Actigall and plan for a laparoscopic cholecystectomy when the patient can schedule it.

## 2024-03-20 NOTE — HISTORY OF PRESENT ILLNESS
[de-identified] : Ms. Judi Becker is a 61-year-old female with PSHx of band conversion to VSG, approximately 2 years s/p VSG conversion to RYGB for GERD returns today. During her last visit, she reported repetitive right upper quadrant post-prandial pain. We started her on actigall 300 mg BID and got an MRCP to assess the biliary system. MRCP done on 03/07/2024 shows gallstones with nothing in the common duct. Today, she complains of post-prandial abd pain that last for a couple of hours and occurs 3-4 times a week. Advised the patient to continue Actigall and plan for a laparoscopic cholecystectomy when the patient can schedule it.

## 2024-03-20 NOTE — REASON FOR VISIT
[Medical Office: ([facility])___] : at the medical office located in  [Patient] : the patient [Follow-Up Visit] : a follow-up visit for [S/P Bariatric Surgery] : s/p bariatric surgery

## 2024-03-20 NOTE — END OF VISIT
[Time Spent: ___ minutes] : I have spent [unfilled] minutes of time on the encounter. [FreeTextEntry3] :  All medical record entries made by the Scribe were at my, JR Batista , direction and personally dictated by me on 03/20/2024 . I have reviewed the chart and agree that the record accurately reflects my personal performance of the history, physical exam, assessment and plan. I have also personally directed, reviewed, and agreed with the chart.

## 2024-03-20 NOTE — PLAN
[FreeTextEntry1] : Continue Actigall and plan for a laparoscopic cholecystectomy when the patient can schedule it.

## 2024-04-15 RX ORDER — URSODIOL 300 MG/1
300 CAPSULE ORAL
Qty: 90 | Refills: 0 | Status: ACTIVE | COMMUNITY
Start: 2024-01-31 | End: 1900-01-01

## 2024-05-20 ENCOUNTER — TRANSCRIPTION ENCOUNTER (OUTPATIENT)
Age: 64
End: 2024-05-20

## 2024-05-20 VITALS
WEIGHT: 128.53 LBS | SYSTOLIC BLOOD PRESSURE: 134 MMHG | HEART RATE: 53 BPM | OXYGEN SATURATION: 100 % | DIASTOLIC BLOOD PRESSURE: 76 MMHG | RESPIRATION RATE: 16 BRPM | HEIGHT: 62 IN | TEMPERATURE: 97 F

## 2024-05-20 RX ORDER — LISINOPRIL 2.5 MG/1
1 TABLET ORAL
Qty: 0 | Refills: 0 | DISCHARGE

## 2024-05-20 RX ORDER — DEXLANSOPRAZOLE 30 MG/1
0 CAPSULE, DELAYED RELEASE ORAL
Qty: 0 | Refills: 0 | DISCHARGE

## 2024-05-20 NOTE — ASU PATIENT PROFILE, ADULT - NSICDXPASTSURGICALHX_GEN_ALL_CORE_FT
PAST SURGICAL HISTORY:  Gastric banding status 2008    H/O  section     H/O gastric sleeve 2016    H/O knee surgery right    History of tonsillectomy      PAST SURGICAL HISTORY:  Gastric banding status     H/O  section     H/O gastric sleeve     H/O knee surgery right, meniscus repair    History of gastric bypass 2022    History of tonsillectomy

## 2024-05-21 ENCOUNTER — RESULT REVIEW (OUTPATIENT)
Age: 64
End: 2024-05-21

## 2024-05-21 ENCOUNTER — APPOINTMENT (OUTPATIENT)
Dept: BARIATRICS | Facility: HOSPITAL | Age: 64
End: 2024-05-21

## 2024-05-21 ENCOUNTER — TRANSCRIPTION ENCOUNTER (OUTPATIENT)
Age: 64
End: 2024-05-21

## 2024-05-21 ENCOUNTER — OUTPATIENT (OUTPATIENT)
Dept: OUTPATIENT SERVICES | Facility: HOSPITAL | Age: 64
LOS: 1 days | Discharge: HOME | End: 2024-05-21
Payer: COMMERCIAL

## 2024-05-21 VITALS
HEART RATE: 75 BPM | RESPIRATION RATE: 17 BRPM | TEMPERATURE: 98 F | SYSTOLIC BLOOD PRESSURE: 135 MMHG | DIASTOLIC BLOOD PRESSURE: 65 MMHG | OXYGEN SATURATION: 98 %

## 2024-05-21 DIAGNOSIS — Z90.3 ACQUIRED ABSENCE OF STOMACH [PART OF]: Chronic | ICD-10-CM

## 2024-05-21 DIAGNOSIS — Z98.84 BARIATRIC SURGERY STATUS: Chronic | ICD-10-CM

## 2024-05-21 DIAGNOSIS — Z98.890 OTHER SPECIFIED POSTPROCEDURAL STATES: Chronic | ICD-10-CM

## 2024-05-21 DIAGNOSIS — Z90.89 ACQUIRED ABSENCE OF OTHER ORGANS: Chronic | ICD-10-CM

## 2024-05-21 DIAGNOSIS — Z98.891 HISTORY OF UTERINE SCAR FROM PREVIOUS SURGERY: Chronic | ICD-10-CM

## 2024-05-21 LAB
BLD GP AB SCN SERPL QL: NEGATIVE — SIGNIFICANT CHANGE UP
RH IG SCN BLD-IMP: POSITIVE — SIGNIFICANT CHANGE UP

## 2024-05-21 PROCEDURE — C1889: CPT

## 2024-05-21 PROCEDURE — C9399: CPT

## 2024-05-21 PROCEDURE — 47562 LAPAROSCOPIC CHOLECYSTECTOMY: CPT

## 2024-05-21 PROCEDURE — 86900 BLOOD TYPING SEROLOGIC ABO: CPT

## 2024-05-21 PROCEDURE — 86850 RBC ANTIBODY SCREEN: CPT

## 2024-05-21 PROCEDURE — 88304 TISSUE EXAM BY PATHOLOGIST: CPT

## 2024-05-21 PROCEDURE — 88304 TISSUE EXAM BY PATHOLOGIST: CPT | Mod: 26

## 2024-05-21 PROCEDURE — 86901 BLOOD TYPING SEROLOGIC RH(D): CPT

## 2024-05-21 DEVICE — SURGIFLO HEMOSTATIC MATRIX KIT: Type: IMPLANTABLE DEVICE | Status: FUNCTIONAL

## 2024-05-21 RX ORDER — ACETAMINOPHEN 500 MG
2 TABLET ORAL
Qty: 32 | Refills: 0
Start: 2024-05-21 | End: 2024-05-24

## 2024-05-21 RX ORDER — ACETAMINOPHEN 500 MG
1000 TABLET ORAL ONCE
Refills: 0 | Status: COMPLETED | OUTPATIENT
Start: 2024-05-21 | End: 2024-05-21

## 2024-05-21 RX ORDER — FENTANYL CITRATE 50 UG/ML
25 INJECTION INTRAVENOUS ONCE
Refills: 0 | Status: DISCONTINUED | OUTPATIENT
Start: 2024-05-21 | End: 2024-05-21

## 2024-05-21 RX ORDER — ONDANSETRON 8 MG/1
4 TABLET, FILM COATED ORAL EVERY 6 HOURS
Refills: 0 | Status: DISCONTINUED | OUTPATIENT
Start: 2024-05-21 | End: 2024-05-21

## 2024-05-21 RX ORDER — OXYCODONE HYDROCHLORIDE 5 MG/1
1 TABLET ORAL
Qty: 8 | Refills: 0
Start: 2024-05-21 | End: 2024-05-22

## 2024-05-21 RX ORDER — KETOROLAC TROMETHAMINE 30 MG/ML
15 SYRINGE (ML) INJECTION ONCE
Refills: 0 | Status: DISCONTINUED | OUTPATIENT
Start: 2024-05-21 | End: 2024-05-21

## 2024-05-21 RX ORDER — ONDANSETRON 8 MG/1
1 TABLET, FILM COATED ORAL
Qty: 12 | Refills: 0
Start: 2024-05-21 | End: 2024-05-23

## 2024-05-21 RX ORDER — LISINOPRIL 2.5 MG/1
1 TABLET ORAL
Refills: 0 | DISCHARGE

## 2024-05-21 RX ORDER — SIMVASTATIN 20 MG/1
1 TABLET, FILM COATED ORAL
Qty: 0 | Refills: 0 | DISCHARGE

## 2024-05-21 RX ORDER — APREPITANT 80 MG/1
40 CAPSULE ORAL ONCE
Refills: 0 | Status: COMPLETED | OUTPATIENT
Start: 2024-05-21 | End: 2024-05-21

## 2024-05-21 RX ADMIN — FENTANYL CITRATE 25 MICROGRAM(S): 50 INJECTION INTRAVENOUS at 14:52

## 2024-05-21 RX ADMIN — Medication 15 MILLIGRAM(S): at 14:25

## 2024-05-21 RX ADMIN — APREPITANT 40 MILLIGRAM(S): 80 CAPSULE ORAL at 11:53

## 2024-05-21 RX ADMIN — Medication 400 MILLIGRAM(S): at 17:16

## 2024-05-21 RX ADMIN — Medication 15 MILLIGRAM(S): at 13:54

## 2024-05-21 RX ADMIN — FENTANYL CITRATE 25 MICROGRAM(S): 50 INJECTION INTRAVENOUS at 14:59

## 2024-05-21 RX ADMIN — ONDANSETRON 4 MILLIGRAM(S): 8 TABLET, FILM COATED ORAL at 13:56

## 2024-05-21 RX ADMIN — FENTANYL CITRATE 25 MICROGRAM(S): 50 INJECTION INTRAVENOUS at 14:25

## 2024-05-21 RX ADMIN — Medication 1000 MILLIGRAM(S): at 17:52

## 2024-05-21 NOTE — PRE-ANESTHESIA EVALUATION ADULT - NSANTHOSAYNRD_GEN_A_CORE
No. ROMULO screening performed.  STOP BANG Legend: 0-2 = LOW Risk; 3-4 = INTERMEDIATE Risk; 5-8 = HIGH Risk

## 2024-05-21 NOTE — ASU DISCHARGE PLAN (ADULT/PEDIATRIC) - CARE PROVIDER_API CALL
Dwaine Page  Surgery  [address]  [place] [address]  Phone: [phone]  Fax: [phone]  Follow Up Time: 2 weeks

## 2024-05-21 NOTE — BRIEF OPERATIVE NOTE - OPERATION/FINDINGS
Pt placed in reverse Trendelenburg w/ right side up. Omental attachments to GB were gently swept away. GB fundus retracted superiorly over dome of liver. Filmy adhesions between the GB & omentum/duo cauterized. GB infundibulum retracted laterally towards RLQ exposing Calot’s triangle. Critical view of safety was then obtained. Cystic duct and artery clipped and divided. Peritoneal attachments btw GB & liver bed  w/ electrocautery. Hemostasis achieved. No leakage of bile from cystic duct stump. GB was removed without incident and incisions were closed w/ monocryl sutures.

## 2024-05-21 NOTE — ASU DISCHARGE PLAN (ADULT/PEDIATRIC) - CALL YOUR DOCTOR IF YOU HAVE ANY OF THE FOLLOWING:
Swelling that gets worse/Pain not relieved by Medications/Fever greater than (need to indicate Fahrenheit or Celsius)/Wound/Surgical Site with redness, or foul smelling discharge or pus/Nausea and vomiting that does not stop
age(85 years old or older)

## 2024-06-03 LAB — SURGICAL PATHOLOGY STUDY: SIGNIFICANT CHANGE UP

## 2024-06-05 ENCOUNTER — APPOINTMENT (OUTPATIENT)
Dept: BARIATRICS | Facility: CLINIC | Age: 64
End: 2024-06-05
Payer: COMMERCIAL

## 2024-06-05 VITALS
WEIGHT: 126 LBS | DIASTOLIC BLOOD PRESSURE: 72 MMHG | HEART RATE: 65 BPM | SYSTOLIC BLOOD PRESSURE: 161 MMHG | BODY MASS INDEX: 23.79 KG/M2 | TEMPERATURE: 97.3 F | HEIGHT: 61 IN | OXYGEN SATURATION: 97 %

## 2024-06-05 DIAGNOSIS — Z90.49 ACQUIRED ABSENCE OF OTHER SPECIFIED PARTS OF DIGESTIVE TRACT: ICD-10-CM

## 2024-06-05 PROCEDURE — 99024 POSTOP FOLLOW-UP VISIT: CPT

## 2024-10-23 NOTE — ASU PREOP CHECKLIST - NOTHING BY MOUTH SINCE
EGD DISCHARGE INSTRUCTIONS    Activity:  Rest today. No driving, operating machinery, or making any important decisions today. May resume normal activity tomorrow.    Diet:  Following EGD eat slowly, chew food well, cut food into small pieces-Avoid greasy, spicy, \"crunchy\" foods X 48 hours.     Call your Doctor if you have any of the following:  -Passing blood rectally or vomiting blood (it may be red or black).  -Persistent nausea/vomiting  -Severe abdominal or chest pain not relieved with passing gas  -Fever of 100 degrees or more  -Redness or swelling at the IV site  -SEVERE CHEST PAIN OR SHORTNESS OF BREATH-CALL 911  -Severe sore throat or neck pain      Cazares's Esophagus  Cazares's esophagus occurs when the lining of the esophagus is damaged. The esophagus is the tube that carries food from the mouth to the stomach. With Cazares's esophagus, the lining of the esophagus gets replaced by material that is similar to the lining in the intestines. This process is called intestinal metaplasia. A small number of people with Cazares's esophagus develop esophageal cancer.  CAUSES   The exact cause of Cazares's esophagus is unknown.  SYMPTOMS   Most people with Cazares's esophagus do not have symptoms. However, many patients also have gastroesophageal reflux disease (GERD). GERD can cause heartburn, trouble swallowing, and a dry cough.  DIAGNOSIS  Cazares's esophagus is diagnosed by an exam called upper gastrointestinal endoscopy. A thin, flexible tube (endoscope) is passed down the esophagus. The endoscope has a light and camera on the end. Your caregiver uses the endoscope to view the inside of the esophagus. A tissue sample may also be taken and examined under a microscope (biopsy). If cancer cells are found during the biopsy, this condition is called dysplasia.  TREATMENT   If you have no dysplasia or low-grade dysplasia, your caregiver may recommend no treatment or only taking medicines to treat GERD. Sometimes, 
20-May-2024
no

## (undated) DEVICE — TUBING STRYKER PNEUMOCLEAR HIGH FLOW

## (undated) DEVICE — Device

## (undated) DEVICE — ENDOCATCH 10MM SPECIMEN POUCH

## (undated) DEVICE — TIP METZENBAUM SCISSOR MONOPOLAR ENDOCUT (ORANGE)

## (undated) DEVICE — TROCAR ETHICON ENDOPATH XCEL BLADELESS 5MM X 100MM STABILITY

## (undated) DEVICE — SYR LUER LOK 30CC

## (undated) DEVICE — DRSG DERMABOND 0.7ML

## (undated) DEVICE — GLV 6.5 PROTEXIS (WHITE)

## (undated) DEVICE — STAPLER COVIDIEN ENDO GIA XL HANDLE

## (undated) DEVICE — APPLICATOR ENDOSCOPIC FOR SUGIFLO

## (undated) DEVICE — SOL IRR BAG NS 0.9% 3000ML

## (undated) DEVICE — PACK GENERAL LAPAROSCOPY

## (undated) DEVICE — MARKING PEN W RULER

## (undated) DEVICE — POSITIONER FOAM EGG CRATE ULNAR 2PCS (PINK)

## (undated) DEVICE — SUT VICRYL 0 27" UR-6

## (undated) DEVICE — TROCAR ETHICON ENDOPATH XCEL BLADELESS 12MM X 100MM STABILITY

## (undated) DEVICE — POSITIONER SAGE MOBILITY TRANSFER PAD

## (undated) DEVICE — SUT PROLENE 2-0 36" SH

## (undated) DEVICE — VENODYNE/SCD SLEEVE CALF LARGE

## (undated) DEVICE — SHEARS HARMONIC ACE 5MM X 36CM CURVED TIP

## (undated) DEVICE — SUT VICRYL 0 54" TIES

## (undated) DEVICE — VENODYNE/SCD SLEEVE CALF MEDIUM

## (undated) DEVICE — TUBING STRYKER PNEUMOSURE HI FLOW INSUFFLATOR

## (undated) DEVICE — WARMING BLANKET UPPER ADULT

## (undated) DEVICE — TROCAR ETHICON ENDOPATH XCEL UNIVERSAL SLEEVE WITH OPTIVIEW 5MM X 100MM

## (undated) DEVICE — DRAPE LEGGINGS XL

## (undated) DEVICE — STAPLER ECHELON FLEX POWERED PLUS 440MM

## (undated) DEVICE — SOL IRR POUR NS 0.9% 500ML

## (undated) DEVICE — TROCAR ETHICON ENDOPATH XCEL BLADELESS 15MM X 100MM STABILITY

## (undated) DEVICE — TROCAR ETHICON ENDOPATH XCEL UNIVERSAL SLEEVE 12MM X 100M STABILITY

## (undated) DEVICE — SUT PDS II 2-0 27" SH

## (undated) DEVICE — SUT MONOCRYL 4-0 27" PS-2 UNDYED

## (undated) DEVICE — LIGASURE MARYLAND 37CM